# Patient Record
Sex: MALE
[De-identification: names, ages, dates, MRNs, and addresses within clinical notes are randomized per-mention and may not be internally consistent; named-entity substitution may affect disease eponyms.]

---

## 2020-06-08 ENCOUNTER — APPOINTMENT (OUTPATIENT)
Dept: OTOLARYNGOLOGY | Facility: CLINIC | Age: 30
End: 2020-06-08
Payer: COMMERCIAL

## 2020-06-08 VITALS — WEIGHT: 160 LBS | HEIGHT: 65 IN | BODY MASS INDEX: 26.66 KG/M2

## 2020-06-08 DIAGNOSIS — Z78.9 OTHER SPECIFIED HEALTH STATUS: ICD-10-CM

## 2020-06-08 PROBLEM — Z00.00 ENCOUNTER FOR PREVENTIVE HEALTH EXAMINATION: Status: ACTIVE | Noted: 2020-06-08

## 2020-06-08 PROCEDURE — 99243 OFF/OP CNSLTJ NEW/EST LOW 30: CPT | Mod: 25

## 2020-06-08 PROCEDURE — 31575 DIAGNOSTIC LARYNGOSCOPY: CPT

## 2020-06-08 RX ORDER — PREDNISONE 10 MG
TABLET ORAL
Refills: 0 | Status: ACTIVE | COMMUNITY

## 2020-06-08 RX ORDER — CETIRIZINE HYDROCHLORIDE 5 MG/1
TABLET ORAL
Refills: 0 | Status: ACTIVE | COMMUNITY

## 2020-06-08 RX ORDER — DOXYCYCLINE 100 MG/1
100 CAPSULE ORAL
Refills: 0 | Status: ACTIVE | COMMUNITY

## 2020-06-08 RX ORDER — FAMOTIDINE 20 MG/1
20 TABLET, FILM COATED ORAL
Qty: 60 | Refills: 3 | Status: ACTIVE | COMMUNITY
Start: 2020-06-08 | End: 1900-01-01

## 2020-06-08 RX ORDER — CHROMIUM 200 MCG
TABLET ORAL
Refills: 0 | Status: ACTIVE | COMMUNITY

## 2020-06-08 NOTE — CONSULT LETTER
[Dear  ___] : Dear  [unfilled], [Consult Letter:] : I had the pleasure of evaluating your patient, [unfilled]. [Please see my note below.] : Please see my note below. [Consult Closing:] : Thank you very much for allowing me to participate in the care of this patient.  If you have any questions, please do not hesitate to contact me. [Sincerely,] : Sincerely, [FreeTextEntry3] : Mercedes Meadows MD\par

## 2020-06-08 NOTE — ASSESSMENT
[FreeTextEntry1] : He has a 3 week history of chronic pharyngitis with a globus sensation, sore throat, and occasional shortness of breath. 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 There were no masses or lesions on exam. His airway was patent without airway obstruction. He did have mild reflux-related laryngeal changes. He could have symptoms due to that. I did not see obvious paradoxical vocal cord motion.\par \par PLAN\par \par -findings and management options discussed in detail with the patient. \par - Hydration, humidification \par - Saltwater gargles.  The patient was asked to avoid alcohol based mouthwashes\par - I recommended EBV and CMV titers. He said he had labs done. I asked him to have the results sent to me. My office will also try to obtain the results. Since he also complains of shortness of breath, I recommended he speak with his doctor to see if COVID testing is needed\par - I would hold off on more antibiotics since he just finished a course and his throat culture was reportedly negative. \par - Reflux precautions and elevation of the head of bed at night.  Literature given.\par - Trial of pepcid\par - Allergy medication as needed \par - If he is not improving, I recommend ph probe testing to rule out reflux, allergy evaluation, possible sleep study (if he has difficulty breathing at night), and evaluation for paradoxical vocal cord motion.  Pulmonary evaluation may also be considered. \par - uvulectomy could be considered if work up is negative. \par - He should go to the ER if he has difficulty breathing \par - follow up in 2-3 weeks. \par - call and return earlier if any concerns

## 2020-06-08 NOTE — HISTORY OF PRESENT ILLNESS
[de-identified] : CECILY FREEMAN is a 29 year patient seen in consultation for chronic pharyngitis. He was referred by Dr. Montemayor.  For the past 3 weeks, he's had a swollen gland sensation, sore throat, and difficulty breathing (at night). He says that his uvular is swollen. He did not feel sick when this started. He has no fevers or cough. He has no voice change, cough, or bleeding.  He denies COVID infection although he has not been tested. He did have recurrent tonsillitis when he was younger. His family had opted against surgery.  He denies history of smoking (tobacco or marijuana) or vaping.  He denies allergies or heartburn. He was treated with doxycycline, prednisone, and antihistamine.  He did not notice any improvement. He finished the antibiotics within the past couple of days.  He said a throat culture was negative. He also had some laboratory testing performed. All precautions and recommendations per Peg were taken during the visit including the use of PPE.\par

## 2020-06-25 ENCOUNTER — APPOINTMENT (OUTPATIENT)
Dept: OTOLARYNGOLOGY | Facility: CLINIC | Age: 30
End: 2020-06-25
Payer: COMMERCIAL

## 2020-06-25 VITALS — TEMPERATURE: 98.5 F

## 2020-06-25 DIAGNOSIS — K21.9 GASTRO-ESOPHAGEAL REFLUX DISEASE W/OUT ESOPHAGITIS: ICD-10-CM

## 2020-06-25 DIAGNOSIS — J31.2 CHRONIC PHARYNGITIS: ICD-10-CM

## 2020-06-25 DIAGNOSIS — K13.79 OTHER LESIONS OF ORAL MUCOSA: ICD-10-CM

## 2020-06-25 PROCEDURE — 99213 OFFICE O/P EST LOW 20 MIN: CPT

## 2020-06-25 NOTE — ASSESSMENT
[FreeTextEntry1] : He has had a several week history of mild throat irritation, globus sensation, and occasional shortness of breath at night. The shortness of breath at night may be better. The other symptoms are mild and may not be bothering him as much. There were no suspicious masses or lesions on exam. 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 He also had mild reflux-related laryngeal changes. I reviewed his test results\par \par PLAN\par \par -findings and management options discussed in detail with the patient. \par - Hydration, humidification and saltwater gargles as needed \par - continue reflux precautions and elevation of the head of bed at night.  \par - I recommended a 2 week trial of pepcid\par - I asked him to consider further work up with ph probe testing or GI evaluation to rule out reflux and allergy evaluation. We would also consider sleep study if he has difficulty breathing at night and possible imaging study.  He would like to wait a little longer before considering more work up. \par - uvulectomy could be considered if work up is negative. \par - If he is doing well, I would like to see him back in 3 months. \par - he should call and return urgently if he has worsening symptoms or any concerns.

## 2020-06-25 NOTE — HISTORY OF PRESENT ILLNESS
[de-identified] : 6/8/20- CECILY FREEMAN is a 29 year patient seen in consultation for chronic pharyngitis. He was referred by Dr. Montemayor.  For the past 3 weeks, he's had a swollen gland sensation, sore throat, and difficulty breathing (at night). He says that his uvular is swollen. He did not feel sick when this started. He has no fevers or cough. He has no voice change, cough, or bleeding.  He denies COVID infection although he has not been tested. He did have recurrent tonsillitis when he was younger. His family had opted against surgery.  He denies history of smoking (tobacco or marijuana) or vaping.  He denies allergies or heartburn. He was treated with doxycycline, prednisone, and antihistamine.  He did not notice any improvement. He finished the antibiotics within the past couple of days.  He said a throat culture was negative. He also had some laboratory testing performed. All precautions and recommendations per Peg were taken during the visit including the use of PPE.\par  [FreeTextEntry1] : \par 6/25/20- CECILY FREEMAN is Here for followup for chronic pharyngitis. He continues to have mild throat irritation. He has occasional difficulty breathing at night but it is not as bad. He tried to alter his diet to follow reflux precautions. He did not try reflux medication. He had laboratory testing which showed prior CMV and EBV infections but was otherwise negative. He was found to have an elongated uvula on exam. Flexible laryngoscopy showed mild reflux related laryngeal changes but no suspicious masses or lesions.

## 2020-06-25 NOTE — CONSULT LETTER
[Dear  ___] : Dear  [unfilled], [Courtesy Letter:] : I had the pleasure of seeing your patient, [unfilled], in my office today. [Please see my note below.] : Please see my note below. [Consult Closing:] : Thank you very much for allowing me to participate in the care of this patient.  If you have any questions, please do not hesitate to contact me. [Sincerely,] : Sincerely, [FreeTextEntry3] : Mercedes Meadows MD\par